# Patient Record
(demographics unavailable — no encounter records)

---

## 2024-11-01 NOTE — REASON FOR VISIT
[Follow-Up Visit] : a follow-up pain management visit [Home] : at home, [unfilled] , at the time of the visit. [Medical Office: ([facility])___] : at the medical office located in  [Patient] : the patient
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
## 2024-11-01 NOTE — PHYSICAL EXAM
[de-identified] : Physical Exam (Telemedicine):  Gen: AAOX3, NAD HEENT: PERRLA, EOMI, NCAT, NP Pulmonary: No Signs of Respiratory Distress MSK: AROM WFL, Limitations painful truncal extension and cervical facet loading Limited range of motion to the right Left upper thoracic muscle hypertonicity Neurological: Grossly neurologically intact Gait: Normal, Non-antalgic, Sans AD Derm: No Rash, (-)Lesions, (-)Erythema, (-)Cyanosis  Psych: Calm, Cooperative, Conversational  Disclaimer: This is a limited examination for the purposes of conducting a telemedicine visit during the COVID-19 pandemic. Physical exam maneuvers were modified as necessary to allow patient to self perform. A focused physician physical examination will be performed during in person visits and should be referred to to determine need for further testing, interventions or degree of disability.

## 2024-11-01 NOTE — DATA REVIEWED
[FreeTextEntry1] : MRI lumbar spine - February 2, 2024 Transitional lumbosacral junction with partial lumbarization of the S1 vertebra Status post L3-4 through L5-S1 posterior decompression with postsurgical changes and posterior paraspinal soft tissue Mild degenerative change in the lumbar spine including multilevel disc bulges and facet arthropathy causing mild bilateral foraminal stenosis at L4-5 and L5-S1

## 2024-11-01 NOTE — ASSESSMENT
[FreeTextEntry1] : Ms. DAVID TORRES is a 49 year F suffering from pain in multiple regions with low back pain, that based upon today's subjective complaints, physical examination, and MRI review, is likely multifactorial with significant component secondary to lumbar spondylosis  >> Medications  Chronic opioid use for non-malignant pain, in particular at high doses would not be recommended since it can potentially lead to hyperalgesia (hypersensitivity), tolerance and addiction.     >> Interventions   None indicated at this time, possible candidate for levator scapula trigger point injection Consider for caudal BEA  >> Therapy and Other Modalities   Continue with daily home stretching regimen. Not amenable to PT  >> Imaging and Other Studies  XRAY Cervical   Thoracic   Sacrum   >> Consults   Since the episodes occurred during moments of strain, stress incontinence was suggested as a possible cause. -Arrange for gyn eval   Neurosurg evel for pituitary adenoma followup  Pain Psychology for coping with chronic pain

## 2024-11-17 NOTE — PHYSICAL EXAM
[General Appearance - Alert] : alert [General Appearance - In No Acute Distress] : in no acute distress [Fluency] : fluency intact [Comprehension] : comprehension intact [Cranial Nerves Optic (II)] : visual acuity intact bilaterally,  pupils equal round and reactive to light [Cranial Nerves Oculomotor (III)] : extraocular motion intact [Cranial Nerves Facial (VII)] : face symmetrical [Cranial Nerves Trigeminal (V)] : facial sensation intact symmetrically [Cranial Nerves Vestibulocochlear (VIII)] : hearing was intact bilaterally [Cranial Nerves Glossopharyngeal (IX)] : tongue and palate midline [Cranial Nerves Accessory (XI - Cranial And Spinal)] : head turning and shoulder shrug symmetric [Cranial Nerves Hypoglossal (XII)] : there was no tongue deviation with protrusion

## 2024-11-17 NOTE — PHYSICAL EXAM
[General Appearance - Alert] : alert [General Appearance - In No Acute Distress] : in no acute distress [Fluency] : fluency intact [Comprehension] : comprehension intact [Cranial Nerves Optic (II)] : visual acuity intact bilaterally,  pupils equal round and reactive to light [Cranial Nerves Oculomotor (III)] : extraocular motion intact [Cranial Nerves Trigeminal (V)] : facial sensation intact symmetrically [Cranial Nerves Facial (VII)] : face symmetrical [Cranial Nerves Vestibulocochlear (VIII)] : hearing was intact bilaterally [Cranial Nerves Glossopharyngeal (IX)] : tongue and palate midline [Cranial Nerves Accessory (XI - Cranial And Spinal)] : head turning and shoulder shrug symmetric [Cranial Nerves Hypoglossal (XII)] : there was no tongue deviation with protrusion
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
## 2024-11-19 NOTE — ASSESSMENT
[FreeTextEntry1] : I have discussed the natural history and treatment options for nonfunctioning pituitary macroadenoma with the patient. I explained the indications for observation and imaging surveillance, radiation therapy, radiosurgery and surgery done through an endoscopic endonasal transsphenoidal approach.  In the end, I recommend an evaluation by Dr. Benito, neuro-ophthalmology for baseline Ding Visual Field testing. The patient should undergo MRI Pituitary+/- and return to the office to see Dr. Justice for further discussion once imaging and above consultations are completed.  We will obtain last office notes from Dr. Amada Vera, her endocrinologist.    The patient understands the plan of care and is in agreement.  All questions answered to patient satisfaction.   A total of 60 minutes were spent relative to this encounter.

## 2024-12-11 NOTE — REVIEW OF SYSTEMS
[Muscle Pain] : muscle pain [Joint Pain] : joint pain [Joint Stiffness] : joint stiffness [Decreased ROM] : decreased range of motion
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
## 2024-12-11 NOTE — REASON FOR VISIT
[Home] : at home, [unfilled] , at the time of the visit. [Medical Office: ([facility])___] : at the medical office located in  [Patient] : the patient [Follow-Up Visit] : a follow-up pain management visit

## 2024-12-11 NOTE — ASSESSMENT
[FreeTextEntry1] : Ms. DAVID TORRES is a 49 year F suffering from pain in multiple regions with low back pain, that based upon today's subjective complaints, physical examination, and MRI review, is likely multifactorial with significant component of myalgia  >> Medications  Chronic opioid use for non-malignant pain, in particular at high doses would not be recommended since it can potentially lead to hyperalgesia (hypersensitivity), tolerance and addiction.     >> Interventions   I explained to the patient that the current clinical guidelines and best practices require a six-month interval between radiofrequency ablations to assess the full benefit and mitigate potential risks. Therefore, the procedure is not indicated at this time, as the six-month period has not yet elapsed since her last ablation in late July.  Possible candidate for levator scapula trigger point injection  >> Therapy and Other Modalities   Remains not amenable to PT  >> Imaging and Other Studies  Obtain XRAY Sacrum / Coccyx and  CT for what seems to be element of coccydynia   I have personally reviewed the images in detail together with the patient today, and I have answered all questions regarding this condition to the best of my ability, to the patient's satisfaction.  >> Consults  Followup with neurology  Neurosurg evel for pituitary adenoma followup  Pain Psychology for coping with chronic pain - has not yet arranged

## 2024-12-11 NOTE — PHYSICAL EXAM
[de-identified] : Physical Exam (Telemedicine):  Gen: AAOX3, NAD HEENT: PERRLA, EOMI, NCAT, NP Pulmonary: No Signs of Respiratory Distress MSK: AROM WFL, Limitations painful truncal ROM  Limited range of motion of right shoulder / overhead  Left upper thoracic muscle hypertonicity Neurological: Grossly neurologically intact Gait: Normal, Non-antalgic, Sans AD Derm: No Rash, (-)Lesions, (-)Erythema, (-)Cyanosis  Psych: Calm, Cooperative, Conversational  Disclaimer: This is a limited examination for the purposes of conducting a telemedicine visit during the COVID-19 pandemic. Physical exam maneuvers were modified as necessary to allow patient to self perform. A focused physician physical examination will be performed during in person visits and should be referred to to determine need for further testing, interventions or degree of disability.
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
## 2025-01-15 NOTE — PHYSICAL EXAM
[de-identified] : Physical Exam (Telemedicine):   Gen: AAOX3, NAD HEENT: PERRLA, EOMI, NCAT, NP Pulmonary: No Signs of Respiratory Distress MSK: AROM WFL, Limitations painful truncal ROM  Pain with truncal extension Limited range of motion of right shoulder / overhead  Neurological: Grossly neurologically intact Gait: Normal, Non-antalgic, Sans AD Derm: No Rash, (-)Lesions, (-)Erythema, (-)Cyanosis  Psych: Calm, Cooperative, Conversational  Disclaimer: This is a limited examination for the purposes of conducting a telemedicine visit during the COVID-19 pandemic. Physical exam maneuvers were modified as necessary to allow patient to self perform. A focused physician physical examination will be performed during in person visits and should be referred to to determine need for further testing, interventions or degree of disability.
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
## 2025-01-15 NOTE — REASON FOR VISIT
[Home] : at home, [unfilled] , at the time of the visit. [Medical Office: ([facility])___] : at the medical office located in  [Follow-Up Visit] : a follow-up pain management visit

## 2025-01-15 NOTE — ASSESSMENT
[FreeTextEntry1] : Ms. DAVID TORRES is a 49 year F suffering from pain in multiple regions with low back pain, that based upon today's subjective complaints, physical examination, and MRI review, is likely multifactorial with significant component of myalgia  >> Medications  Chronic opioid use for non-malignant pain, in particular at high doses would not be recommended since it can potentially lead to hyperalgesia (hypersensitivity), tolerance and addiction.     >> Interventions   Arrange for repeat Bilateral L3, L4, L5 radiofrequency ablation under fluoroscopic guidance. Success rate and possible complications discussed with patient in detail, as a means to avoid use of long term oral analgesics.  Possible candidate for levator scapula trigger point injection  >> Therapy and Other Modalities   Remains not amenable to PT  >> Imaging and Other Studies  I have personally reviewed the images in detail together with the patient today, and I have answered all questions regarding this condition to the best of my ability, to the patient's satisfaction.  >> Consults  Followup with neurology  Neurosurg evel for pituitary adenoma followup  Pain Psychology for coping with chronic pain - Still has not arranged

## 2025-03-12 NOTE — REASON FOR VISIT
[Home] : at home, [unfilled] , at the time of the visit. [Medical Office: ([facility])___] : at the medical office located in  [Telehealth (audio & video)] : This visit was provided via telehealth using real-time 2-way audio visual technology. [Verbal consent obtained from patient] : the patient, [unfilled] [Follow-Up Visit] : a follow-up pain management visit

## 2025-03-12 NOTE — ASSESSMENT
[FreeTextEntry1] : Ms. DAVID TORRES is a 49 year F suffering from pain in multiple regions with low back pain, that based upon today's subjective complaints, physical examination, and MRI review, is likely multifactorial with significant component of myalgia  >> Medications  Chronic opioid use for non-malignant pain, in particular at high doses would not be recommended since it can potentially lead to hyperalgesia (hypersensitivity), tolerance and addiction.     >> Interventions   The patient presents with chronic neck and shoulder pain and is an appropriate candidate for diagnostic RIGHT and LEFT C3,C4,C5 medial branch blocks under fluoroscopic guidance. This procedure is essential for accurately identifying the pain source, specifically targeting the affected spinal levels. Despite conservative management, including medications, physical therapy, and a provider-directed home stretching regimen for over 6 weeks, the symptoms persist and have progressed. The patient has also failed to respond to nonsteroidal anti-inflammatory drugs (NSAIDs) for at least 3 weeks. This diagnostic step adheres to insurance criteria and is critical for evaluating suitability for radiofrequency ablation (RFA), which offers potential long-term relief by interrupting pain signal transmission. Given the ongoing functional disability and significant pain, the necessity of this procedure is clear for guiding effective treatment and improving quality of life. The procedure, including success rate, side effects, and complications, has been discussed with the patient. If only short-term pain relief is achieved with diagnostic medial branch blocks, we will consider radiofrequency denervation of facet joints.   Possible candidate for levator scapula trigger point injection  >> Therapy and Other Modalities   Remains not amenable to PT  >> Imaging and Other Studies   I have personally reviewed the images in detail together with the patient today, and I have answered all questions regarding this condition to the best of my ability, to the patient's satisfaction.  >> Consults  Followup with neurology  Neurosurg evel for pituitary adenoma followup  Pain Psychology for coping with chronic pain - Still has not arranged

## 2025-03-12 NOTE — PHYSICAL EXAM
[de-identified] : Physical Exam (Telemedicine):   Gen: AAOX3, NAD HEENT: PERRLA, EOMI, NCAT, NP Pulmonary: No Signs of Respiratory Distress MSK: AROM WFL, Limitations painful cervical ROM , worse in extension (facet loading)  Pain with truncal extension Limited range of motion of right shoulder / overhead  Neurological: Grossly neurologically intact Gait: Normal, Non-antalgic, Sans AD Derm: No Rash, (-)Lesions, (-)Erythema, (-)Cyanosis  Psych: Calm, Cooperative, Conversational  Disclaimer: This is a limited examination for the purposes of conducting a telemedicine visit during the COVID-19 pandemic. Physical exam maneuvers were modified as necessary to allow patient to self perform. A focused physician physical examination will be performed during in person visits and should be referred to to determine need for further testing, interventions or degree of disability.
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
## 2025-04-07 NOTE — HISTORY OF PRESENT ILLNESS
[FreeTextEntry1] : ER f/u  [de-identified] : 50-year-old female with history of hypothyroidism presenting after [facility] ER visit on 3/30/2025 for a cough, left hip pain and chest pain.  Patient had EKG and labs done which were normal.  She had CT cervical spine without IV contrast which showed no fracture or dislocation. CT chest with IV contrast to rule out PE which showed no pulmonary embolus but showed enlargement of the main pulmonary artery which is likely in the setting of pulmonary hypertension.  And x-ray of the hip showed no fracture or dislocation.  Patient was discharged home to follow-up with PCP and pain management.   Patient also complains of pelvic/inguinal pain on the left side. Patient reports she continues to have right arm/right axillary pain.  Reports she had an MRI last year due to breast implants.

## 2025-04-07 NOTE — HISTORY OF PRESENT ILLNESS
[FreeTextEntry1] : ER f/u  [de-identified] : 50-year-old female with history of hypothyroidism presenting after [facility] ER visit on 3/30/2025 for a cough, left hip pain and chest pain.  Patient had EKG and labs done which were normal.  She had CT cervical spine without IV contrast which showed no fracture or dislocation. CT chest with IV contrast to rule out PE which showed no pulmonary embolus but showed enlargement of the main pulmonary artery which is likely in the setting of pulmonary hypertension.  And x-ray of the hip showed no fracture or dislocation.  Patient was discharged home to follow-up with PCP and pain management.   Patient also complains of pelvic/inguinal pain on the left side. Patient reports she continues to have right arm/right axillary pain.  Reports she had an MRI last year due to breast implants.

## 2025-04-07 NOTE — HEALTH RISK ASSESSMENT
[No] : No [No falls in past year] : Patient reported no falls in the past year [0] : 2) Feeling down, depressed, or hopeless: Not at all (0) [PHQ-2 Negative - No further assessment needed] : PHQ-2 Negative - No further assessment needed [Never] : Never [Audit-CScore] : 0 [[id number]] : 0

## 2025-04-07 NOTE — ASSESSMENT
[FreeTextEntry1] : 50 year old female presented after ED visit. reviewed ED note, labs, imaging. enlarged pulmonary artery - will get ECHO. Referred patient to GI and gyn. US Pelvis for pelvic pain - and referred to gyn.

## 2025-04-07 NOTE — HEALTH RISK ASSESSMENT
[No] : No [No falls in past year] : Patient reported no falls in the past year [0] : 2) Feeling down, depressed, or hopeless: Not at all (0) [PHQ-2 Negative - No further assessment needed] : PHQ-2 Negative - No further assessment needed [Never] : Never [Audit-CScore] : 0 [[id number]] : 0

## 2025-05-05 NOTE — PHYSICAL EXAM
[de-identified] : Physical Exam (Telemedicine):   Gen: AAOX3, NAD HEENT: PERRLA, EOMI, NCAT, NP Pulmonary: No Signs of Respiratory Distress MSK: AROM WFL, Limitations painful cervical ROM , worse in extension (facet loading)  Pain with truncal extension Limited range of motion of right shoulder / overhead  Neurological: Grossly neurologically intact Gait: Normal, Non-antalgic, Sans AD Derm: No Rash, (-)Lesions, (-)Erythema, (-)Cyanosis  Psych: Calm, Cooperative, Conversational  Disclaimer: This is a limited examination for the purposes of conducting a telemedicine visit during the COVID-19 pandemic. Physical exam maneuvers were modified as necessary to allow patient to self perform. A focused physician physical examination will be performed during in person visits and should be referred to to determine need for further testing, interventions or degree of disability.

## 2025-05-05 NOTE — REASON FOR VISIT
[Home] : at home, [unfilled] , at the time of the visit. [Medical Office: ([facility])___] : at the medical office located in  [Telehealth (audio & video)] : This visit was provided via telehealth using real-time 2-way audio visual technology. [Verbal consent obtained from patient] : the patient, [unfilled] [Follow-Up Visit] : a follow-up pain management visit
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
## 2025-05-05 NOTE — ASSESSMENT
[FreeTextEntry1] : Ms. DAVID TORRES is a 49 year F suffering from pain in multiple regions with low back pain, that based upon today's subjective complaints, physical examination, and MRI review, is likely multifactorial with significant component of myalgia  >> Medications  Chronic opioid use for non-malignant pain, in particular at high doses would not be recommended since it can potentially lead to hyperalgesia (hypersensitivity), tolerance and addiction.     >> Interventions   The patient  is an appropriate candidate for REPEAT diagnostic RIGHT and LEFT C3,C4,C5 medial branch blocks under fluoroscopic guidance. This procedure is essential for accurately identifying the pain source, specifically targeting the affected spinal levels. Despite conservative management, including medications, physical therapy, and a provider-directed home stretching regimen for over 6 weeks, the symptoms persist and have progressed. The patient has also failed to respond to nonsteroidal anti-inflammatory drugs (NSAIDs) for at least 3 weeks. This diagnostic step adheres to insurance criteria and is critical for evaluating suitability for radiofrequency ablation (RFA), which offers potential long-term relief by interrupting pain signal transmission. Given the ongoing functional disability and significant pain, the necessity of this procedure is clear for guiding effective treatment and improving quality of life. The procedure, including success rate, side effects, and complications, has been discussed with the patient. If only short-term pain relief is achieved with diagnostic medial branch blocks, we will consider radiofrequency denervation of facet joints.   Possible candidate for levator scapula trigger point injection   >> Therapy and Other Modalities   Remains not amenable to PT   >> Imaging and Other Studies  I have personally reviewed the images in detail together with the patient today, and I have answered all questions regarding this condition to the best of my ability, to the patient's satisfaction.  >> Consults  Followup with neurology as scheduled  Neurosurg evel for pituitary adenoma followup  Pain Psychology for coping with chronic pain - Still has not arranged

## 2025-06-25 NOTE — PHYSICAL EXAM
[de-identified] : Physical Exam (Telemedicine):   Gen: AAOX3, NAD HEENT: PERRLA, EOMI, Pulmonary: No Signs of Respiratory Distress MSK: Limitations painful cervical ROM , worse in extension (facet loading)  Pain with truncal extension Limited range of motion of right shoulder / overhead  Neurological: Grossly neurologically intact Gait: Normal, Non-antalgic, Sans AD Derm: No Rash, (-)Lesions, (-)Erythema, (-)Cyanosis  Psych: Calm, Cooperative, Conversational  Disclaimer: This is a limited examination for the purposes of conducting a telemedicine visit during the COVID-19 pandemic. Physical exam maneuvers were modified as necessary to allow patient to self perform. A focused physician physical examination will be performed during in person visits and should be referred to to determine need for further testing, interventions or degree of disability.
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
## 2025-06-25 NOTE — REASON FOR VISIT
[Follow-Up Visit] : a follow-up pain management visit [Home] : at home, [unfilled] , at the time of the visit. [Medical Office: ([facility])___] : at the medical office located in  [Telehealth (audio & video)] : This visit was provided via telehealth using real-time 2-way audio visual technology. [Verbal consent obtained from patient] : the patient, [unfilled]

## 2025-06-25 NOTE — REVIEW OF SYSTEMS
[Back Pain] : back pain [Joint Pain] : joint pain [Neck Pain] : neck pain [Joint Stiffness] : joint stiffness [Decreased ROM] : decreased range of motion [Negative] : Heme/Lymph
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
## 2025-06-25 NOTE — ASSESSMENT
[FreeTextEntry1] : Ms. DAVID TORRES is a 49 year F suffering from pain in multiple regions with low back pain, that based upon today's subjective complaints, physical examination, and MRI review, is likely multifactorial with significant component of myalgia  >> Medications  Chronic opioid use for non-malignant pain, in particular at high doses would not be recommended since it can potentially lead to hyperalgesia (hypersensitivity), tolerance and addiction.     >> Interventions   The patient  is an appropriate candidate for   RIGHT and LEFT C3,C4,C5  radiofrequency ablation (RFA). Success rate and possible complications discussed with patient in detail.  Possible candidate for levator scapula trigger point injection  >> Therapy and Other Modalities   Remains not amenable to PT   >> Imaging and Other Studies  I have personally reviewed the images in detail together with the patient today, and I have answered all questions regarding this condition to the best of my ability, to the patient's satisfaction.  >> Consults  Followup with neurology as scheduled  Neurosurg evel for pituitary adenoma followup  Pain Psychology for coping with chronic pain - Still has not arranged